# Patient Record
Sex: MALE | Race: ASIAN | NOT HISPANIC OR LATINO | URBAN - METROPOLITAN AREA
[De-identification: names, ages, dates, MRNs, and addresses within clinical notes are randomized per-mention and may not be internally consistent; named-entity substitution may affect disease eponyms.]

---

## 2020-01-08 ENCOUNTER — OFFICE VISIT - HEALTHEAST (OUTPATIENT)
Dept: FAMILY MEDICINE | Facility: CLINIC | Age: 30
End: 2020-01-08

## 2020-01-08 DIAGNOSIS — R50.9 FEVER, UNSPECIFIED FEVER CAUSE: ICD-10-CM

## 2020-01-08 DIAGNOSIS — J10.1 INFLUENZA B: ICD-10-CM

## 2020-01-08 LAB
FLUAV AG SPEC QL IA: ABNORMAL
FLUBV AG SPEC QL IA: ABNORMAL

## 2020-08-13 ENCOUNTER — VIRTUAL VISIT (OUTPATIENT)
Dept: FAMILY MEDICINE | Facility: OTHER | Age: 30
End: 2020-08-13

## 2020-08-14 ENCOUNTER — AMBULATORY - HEALTHEAST (OUTPATIENT)
Dept: FAMILY MEDICINE | Facility: CLINIC | Age: 30
End: 2020-08-14

## 2020-08-14 DIAGNOSIS — Z20.822 SUSPECTED COVID-19 VIRUS INFECTION: ICD-10-CM

## 2020-08-14 NOTE — PROGRESS NOTES
"Date: 2020 17:08:20  Clinician: Whit Hester  Clinician NPI: [id number]  Patient: malou del rio  Patient : 1990  Patient Address: [address] [place] [address] [address], [place] [address]  Patient Phone: [phone]  Visit Protocol: URI  Patient Summary:  malou is a 29 year old ( : 1990 ) male who initiated a Visit for cold, sinus infection, or influenza. When asked the question \"Please sign me up to receive news, health information and promotions. \", malou responded \"No\".    malou states his symptoms started 1-2 days ago.   His symptoms consist of chills, a sore throat, ageusia, a cough, nasal congestion, rhinitis, myalgia, anosmia, and malaise. He is experiencing difficulty breathing due to nasal congestion but he is not short of breath. malou also feels feverish.   Symptom details     Nasal secretions: The color of his mucus is white.    Cough: malou coughs a few times an hour and his cough is more bothersome at night. Phlegm comes into his throat when he coughs. He believes his cough is caused by post-nasal drip. The color of the phlegm is green.     Sore throat: malou reports having mild throat pain (1-3 on a 10 point pain scale), does not have exudate on his tonsils, and can swallow liquids. The lymph nodes in his neck are not enlarged. A rash has not appeared on the skin since the sore throat started.     Temperature: His current temperature is 99.2 degrees Fahrenheit.      malou denies having ear pain, headache, enlarged lymph nodes, nausea, teeth pain, diarrhea, vomiting, facial pain or pressure, and wheezing. He also denies taking antibiotic medication in the past month and having recent facial or sinus surgery in the past 60 days.   Precipitating events  Within the past week, malou has not been exposed to someone with strep throat. He has not recently been exposed to someone with influenza. malou has been in close contact with the following high risk individuals: children under the age of 5.  "  Pertinent COVID-19 (Coronavirus) information  In the past 14 days, malou has not worked in a congregate living setting.   He does not work or volunteer as healthcare worker or a  and does not work or volunteer in a healthcare facility.   malou also has not lived in a congregate living setting in the past 14 days. He does not live with a healthcare worker.   malou has not had a close contact with a laboratory-confirmed COVID-19 patient within 14 days of symptom onset.   Since December 2019, malou and has had upper respiratory infection (URI) or influenza-like illness. Has not been diagnosed with lab-confirmed COVID-19 test      Date(s) of previous URI or influenza-like illness (free-text): jan 2020     Symptoms malou experienced during previous URI or influenza-like illness as reported by the patient (free-text): cough, fever, flu        Pertinent medical history  malou needs a return to work/school note.   Weight: 155 lbs   malou does not smoke or use smokeless tobacco.   Weight: 155 lbs    MEDICATIONS: No current medications, ALLERGIES: NKDA  Clinician Response:  Dear malou,         Your symptoms show that you may have coronavirus (COVID-19). This&nbsp;illness can cause fever, cough and trouble breathing. Many people get a mild case and get better on their own. Some people can get very sick.  What should I do?  We would like to test you for this virus.  1. Please call [phone] to schedule your visit. Explain that you were referred by [facility] to have a COVID-19 test. Be ready to share your [facility] visit ID number.  The following will serve as your written order for this COVID Test, ordered by me, for the indication of suspected COVID [Z20.828]: The test will be ordered in [facility], our electronic health record, after you are scheduled. It will show as ordered and authorized by [place] MD.  Order: COVID-19 (Coronavirus) PCR for SYMPTOMATIC testing from [facility].    2. When it's time for your COVID  "test:  Stay at least 6 feet away from others. (If someone will drive you to your test, stay in the backseat, as far away from the  as you can.)  Cover your mouth and nose with a mask, tissue or washcloth.  Go straight to the testing site. Don't make any stops on the way there or back.    3.Starting now:&nbsp;Stay home and away from others (self-isolate) until:   You've had&nbsp;no&nbsp;fever---and no medicine that reduces fever---for one full day (24 hours).&nbsp;And...  Your other symptoms have gotten better. For example, your cough or breathing has improved.&nbsp;And...  At least&nbsp;10 days&nbsp;have passed since your symptoms started.    During this time, don't leave the house except for testing or medical care.   Stay in your own room, even for meals. Use your own bathroom if you can.  Stay away from others in your home. No hugging, kissing or shaking hands. No visitors.  Don't go to work, school or anywhere else.   Clean \"high touch\" surfaces often (doorknobs, counters, handles, etc.). Use a household cleaning spray or wipes. You'll find a full list of  on the EPA website:&nbsp;www.epa.gov/pesticide-registration/list-n-disinfectants-use-against-sars-cov-2.   Cover your mouth and nose with a mask, tissue or washcloth to avoid spreading germs.  Wash your hands and face often. Use soap and water.  Caregivers in these groups are at risk for severe illness due to COVID-19:  o People 65 years and older  o People who live in a nursing home or long-term care facility  o People with chronic disease (lung, heart, cancer, diabetes, kidney, liver, immunologic)  o People who have a weakened immune system, including those who:   Are in cancer treatment  Take medicine that weakens the immune system, such as corticosteroids  Had a bone marrow or organ transplant  Have an immune deficiency  Have poorly controlled HIV or AIDS  Are obese (body mass index of 40 or higher)  Smoke regularly   o Caregivers should wear " gloves while washing dishes, handling laundry and cleaning bedrooms and bathrooms.  o Use caution when washing and drying laundry: Don't shake dirty laundry, and use the warmest water setting that you can.  o For more tips, go to&nbsp;www.cdc.gov/coronavirus/2019-ncov/downloads/10Things.pdf.   How can I take care of myself?    Get lots of rest. Drink extra fluids&nbsp;(unless a doctor has told you not to).  Take Tylenol (acetaminophen) for fever or pain.&nbsp;If you have liver or kidney problems, ask your family doctor if it's okay to take Tylenol.   Adults can take either:   650 mg (two 325 mg pills) every 4 to 6 hours,&nbsp;or...  1,000 mg (two 500 mg pills) every 8 hours as needed.  Note:&nbsp;Don't take more than 3,000 mg in one day. Acetaminophen is found in many medicines (both prescribed and over-the-counter medicines). Read all labels to be sure you don't take too much.   For children, check the Tylenol bottle for the right dose. The dose is based on the child's age or weight.   If you have other health problems (like cancer, heart failure, an organ transplant or severe kidney disease):&nbsp;Call your specialty clinic if you don't feel better in the next 2 days.    Know when to call 911.&nbsp;Emergency warning signs include:   Trouble breathing or shortness of breath Pain or pressure in the chest that doesn't go away Feeling confused like you haven't felt before, or not being able to wake up Bluish-colored lips or face.  Where can I get more information?    [facility] -- About COVID-19:&nbsp;[email or web address]  CDC -- What to Do If You're Sick:&nbsp;www.cdc.gov/coronavirus/2019-ncov/about/steps-when-sick.html  CDC -- Ending Home Isolation:&nbsp;www.cdc.gov/coronavirus/2019-ncov/hcp/disposition-in-home-patients.html  CDC -- Caring for Someone:&nbsp;www.cdc.gov/coronavirus/2019-ncov/if-you-are-sick/care-for-someone.html  [facility] -- Interim Guidance for Hospital Discharge to  Home:&nbsp;www.health.[facility].[place]/diseases/coronavirus/hcp/hospdischarge.pdf  [facility] clinical trials (COVID-19 research studies):&nbsp;clinicalaffairs.[facility]/umn-clinical-trials  Below are the COVID-19 hotlines at the [place] [facility] ([facility]). Interpreters are available.   For health questions: Call 068-164-0449 or 1-792.816.5575 (7 a.m. to 7 p.m.) For questions about schools and childcare: Call 924-650-4404 or 1-537.585.7418 (7 a.m. to 7 p.m.)           Diagnosis: Other malaise  Diagnosis ICD: R53.81

## 2020-08-16 ENCOUNTER — COMMUNICATION - HEALTHEAST (OUTPATIENT)
Dept: SCHEDULING | Facility: CLINIC | Age: 30
End: 2020-08-16

## 2020-08-20 ENCOUNTER — NURSE TRIAGE (OUTPATIENT)
Dept: NURSING | Facility: CLINIC | Age: 30
End: 2020-08-20

## 2020-10-13 ENCOUNTER — COMMUNICATION - HEALTHEAST (OUTPATIENT)
Dept: SCHEDULING | Facility: CLINIC | Age: 30
End: 2020-10-13

## 2021-06-04 VITALS
DIASTOLIC BLOOD PRESSURE: 83 MMHG | WEIGHT: 156.5 LBS | TEMPERATURE: 98.5 F | RESPIRATION RATE: 12 BRPM | HEART RATE: 76 BPM | SYSTOLIC BLOOD PRESSURE: 123 MMHG | OXYGEN SATURATION: 98 %

## 2021-06-05 NOTE — PROGRESS NOTES
Subjective:   Edel Jessica is a 29 y.o. year old male who presents to urgent care today for the following health issues:    Chief Complaint   Patient presents with     Cough     started this morning, fever started tuesday, congested, headache, runny nose, no chest pain     Monday felt very fatigued   Tuesday started to have a headache, subjective fever. Then fever at night of 102.4F. Took tylenol and ibuprofen   No shortness of breath   Coughing, congestion and rhinitis, slight headaches   Two kids are sick at home for a week (with fevers too), though were not tested for anything and now are improving          PMHX:   PAST MEDICAL HISTORY:  No past medical history     CURRENT MEDICATIONS:  No current outpatient medications on file.     No current facility-administered medications for this visit.        ALLERGIES:  No Known Allergies           Objective:     Vitals:    01/08/20 1652   BP: 123/83   Pulse: 76   Resp: 12   Temp: 98.5  F (36.9  C)   TempSrc: Oral   SpO2: 98%   Weight: 156 lb 8 oz (71 kg)     There is no height or weight on file to calculate BMI.    General appearance: Alert, cooperative, no distress, appears stated age  Head: Normocephalic, atraumatic, without obvious abnormality  Eyes: Conjunctiva clear.  Nose: Clear rhinorrhea present   Throat: Lips, mucosa, tongue normal mucosa pink and moist  Neck: No cervical lymphadenopathy present   Lungs: Clear to auscultation bilaterally, no wheezing or crackles present.  Respirations unlabored  Heart: Regular rate and rhythm, normal S1 and S2, no murmur, rub or gallop.  Neurologic: Alert, normal gait     Recent Results (from the past 24 hour(s))   Influenza A/B Rapid Test- Nasal Swab   Result Value Ref Range    Influenza  A, Rapid Antigen No Influenza A antigen detected No Influenza A antigen detected    Influenza B, Rapid Antigen Influenza B antigen detected (!) No Influenza B antigen detected       Assessment and Plan:     1. Influenza B  Patient tested  positive for influenza B, symptoms consistent with this as well. Given duration of symptoms recommend initiation of Tamiflu for antiviral treatment. Precautions given for indications to return including increased work of breathing, failure of fevers to respond to tylenol and ibuprofen, or failure to improve after 7 days. Work note given. Discussed symptom management with ibuprofen, tylenol, increased hydration and fluids.   - oseltamivir (TAMIFLU) 75 MG capsule; Take 1 capsule (75 mg total) by mouth 2 (two) times a day for 5 days.  Dispense: 10 capsule; Refill: 0  - Influenza A/B Rapid Test- Nasal Swab       Xhong Jessica and/or guardian engaged in the decision making process and verbalized understanding of the options discussed and agreed with the final plan.    Eileen Stevenson, DO

## 2021-06-20 NOTE — LETTER
Letter by Eileen Stevenson DO at      Author: Eileen Stevenson DO Service: -- Author Type: --    Filed:  Encounter Date: 1/8/2020 Status: Signed         January 8, 2020     Patient: Edel Jessica   YOB: 1990   Date of Visit: 1/8/2020       To Whom it May Concern:    Edel Jessica was seen in my clinic on 1/8/2020. Please excuse him from work until 1/12/2020.       If you have any questions or concerns, please don't hesitate to call.    Sincerely,         Electronically signed by Eileen Stevenson DO